# Patient Record
Sex: FEMALE | Race: WHITE | Employment: FULL TIME | URBAN - METROPOLITAN AREA
[De-identification: names, ages, dates, MRNs, and addresses within clinical notes are randomized per-mention and may not be internally consistent; named-entity substitution may affect disease eponyms.]

---

## 2025-01-07 NOTE — PROGRESS NOTES
"Subjective   Patient ID: Mila Carlson is a 34 y.o. female who presents for Establish Care (Pt is here to establish care / nr) and GI Problem (Pt would like to discuss GI issues, such as painful bloating, acid reflex, nausea, constipation and diarrhea, some difficulty swallowing, fatigue after eating,  for years  / nr).    HPI   NP w/PMHx anxiety, ADHD, sleep disorder, paroxysmal tachycardia here to discuss GI upset    Palpitations - went to ED a few years ago (HR in the 170s). Was discharged w/an event monitor but results were lost. Has not had any severe episodes since then. Now occasionally feels like heart is skipping a beat - happens a few times a week. Has associated dyspnea but no dizziness/CP. +FamHx (PGF s/p triple bypass)    GI upset - previous doctor thought maybe related to anxiety. She has been seeing psychiatry and feels these sxs are under control but does not have any improvement in GI sxs. C/o excessive gastric reflux, dysphagia (sometimes takes her a while to swallow even just saliva), bloating. Abd feels \"full of air.\" Has cut out gluten + most dairy from her diet x 5 years which helped w/sxs of bloating, migraines, + bloody stool (which has resolved since removing gluten).    Still has issues w/nausea, bloating, acid reflux. Takes pepcid daily which helps w/pain of reflux. Has never seen GI or had an EGD. +FamHx throat CA (dad).     Periods used to be irregular but have been more regular since starting sertraline so she attributes this to anxiety. Has been experiencing more clotting than normal. +FamHx ovarian CA (great aunt).    Review of Systems   Respiratory:  Positive for shortness of breath.    Cardiovascular:  Positive for palpitations. Negative for chest pain.   Neurological:  Positive for dizziness.   Psychiatric/Behavioral:  The patient is nervous/anxious.        Objective   /76   Pulse 85   Wt 89.3 kg (196 lb 12.8 oz)   SpO2 98%   BMI 37.80 kg/m²     Physical " Exam    Assessment/Plan   Problem List Items Addressed This Visit    None  Visit Diagnoses         Codes    Palpitations    -  Primary R00.2    Relevant Orders    Holter or Event Cardiac Monitor    Gluten intolerance     K90.41    Bloating     R14.0    Relevant Orders    US PELVIS TRANSABDOMINAL WITH TRANSVAGINAL    Referral to Gastroenterology    Menorrhagia with regular cycle     N92.0    Relevant Orders    US PELVIS TRANSABDOMINAL WITH TRANSVAGINAL    GERD without esophagitis     K21.9    Relevant Orders    Referral to Gastroenterology          FU for CPE at which time will discuss weight loss

## 2025-01-09 ENCOUNTER — OFFICE VISIT (OUTPATIENT)
Dept: PRIMARY CARE | Facility: CLINIC | Age: 35
End: 2025-01-09
Payer: COMMERCIAL

## 2025-01-09 VITALS
OXYGEN SATURATION: 98 % | DIASTOLIC BLOOD PRESSURE: 76 MMHG | SYSTOLIC BLOOD PRESSURE: 120 MMHG | BODY MASS INDEX: 37.8 KG/M2 | WEIGHT: 196.8 LBS | HEART RATE: 85 BPM

## 2025-01-09 DIAGNOSIS — R14.0 BLOATING: ICD-10-CM

## 2025-01-09 DIAGNOSIS — R00.2 PALPITATIONS: Primary | ICD-10-CM

## 2025-01-09 DIAGNOSIS — R73.01 IFG (IMPAIRED FASTING GLUCOSE): ICD-10-CM

## 2025-01-09 DIAGNOSIS — Z13.220 LIPID SCREENING: ICD-10-CM

## 2025-01-09 DIAGNOSIS — K21.9 GERD WITHOUT ESOPHAGITIS: ICD-10-CM

## 2025-01-09 DIAGNOSIS — N92.0 MENORRHAGIA WITH REGULAR CYCLE: ICD-10-CM

## 2025-01-09 DIAGNOSIS — Z13.1 SCREENING FOR DIABETES MELLITUS: ICD-10-CM

## 2025-01-09 DIAGNOSIS — K90.41 GLUTEN INTOLERANCE: ICD-10-CM

## 2025-01-09 DIAGNOSIS — R63.5 WEIGHT GAIN: ICD-10-CM

## 2025-01-09 DIAGNOSIS — Z13.89 SCREENING FOR HEMATURIA OR PROTEINURIA: ICD-10-CM

## 2025-01-09 DIAGNOSIS — E55.9 VITAMIN D DEFICIENCY: ICD-10-CM

## 2025-01-09 PROBLEM — F90.0 ATTENTION DEFICIT HYPERACTIVITY DISORDER, PREDOMINANTLY INATTENTIVE TYPE: Status: ACTIVE | Noted: 2025-01-09

## 2025-01-09 PROBLEM — J45.909 ASTHMA: Status: ACTIVE | Noted: 2025-01-09

## 2025-01-09 PROBLEM — I47.9 PAROXYSMAL TACHYCARDIA: Status: ACTIVE | Noted: 2025-01-09

## 2025-01-09 PROBLEM — G47.9 SLEEP DISTURBANCE: Status: ACTIVE | Noted: 2025-01-09

## 2025-01-09 PROBLEM — F41.9 ANXIETY: Status: ACTIVE | Noted: 2025-01-09

## 2025-01-09 PROBLEM — F32.A DEPRESSION: Status: ACTIVE | Noted: 2025-01-09

## 2025-01-09 PROCEDURE — 99204 OFFICE O/P NEW MOD 45 MIN: CPT | Performed by: PHYSICIAN ASSISTANT

## 2025-01-09 PROCEDURE — 1036F TOBACCO NON-USER: CPT | Performed by: PHYSICIAN ASSISTANT

## 2025-01-09 RX ORDER — SERTRALINE HYDROCHLORIDE 50 MG/1
25 TABLET, FILM COATED ORAL DAILY
COMMUNITY

## 2025-01-09 RX ORDER — SERTRALINE HYDROCHLORIDE 100 MG/1
1.5 TABLET, FILM COATED ORAL
COMMUNITY
Start: 2024-09-15

## 2025-01-09 RX ORDER — HYDROXYZINE HYDROCHLORIDE 50 MG/1
1 TABLET, FILM COATED ORAL
COMMUNITY
Start: 2024-03-21

## 2025-01-09 RX ORDER — ALBUTEROL SULFATE 90 UG/1
2 INHALANT RESPIRATORY (INHALATION) 4 TIMES DAILY
COMMUNITY
Start: 2024-03-23

## 2025-01-09 RX ORDER — TRAZODONE HYDROCHLORIDE 100 MG/1
1.5 TABLET ORAL
COMMUNITY
Start: 2024-09-19

## 2025-01-09 RX ORDER — BUDESONIDE AND FORMOTEROL FUMARATE DIHYDRATE 80; 4.5 UG/1; UG/1
2 AEROSOL RESPIRATORY (INHALATION)
COMMUNITY
Start: 2021-08-11

## 2025-01-09 ASSESSMENT — ENCOUNTER SYMPTOMS
PALPITATIONS: 1
NERVOUS/ANXIOUS: 1
DIZZINESS: 1
SHORTNESS OF BREATH: 1

## 2025-01-09 ASSESSMENT — PATIENT HEALTH QUESTIONNAIRE - PHQ9
SUM OF ALL RESPONSES TO PHQ9 QUESTIONS 1 AND 2: 0
1. LITTLE INTEREST OR PLEASURE IN DOING THINGS: NOT AT ALL
2. FEELING DOWN, DEPRESSED OR HOPELESS: NOT AT ALL

## 2025-01-09 ASSESSMENT — PAIN SCALES - GENERAL: PAINLEVEL_OUTOF10: 0-NO PAIN

## 2025-01-09 ASSESSMENT — COLUMBIA-SUICIDE SEVERITY RATING SCALE - C-SSRS
2. HAVE YOU ACTUALLY HAD ANY THOUGHTS OF KILLING YOURSELF?: NO
1. IN THE PAST MONTH, HAVE YOU WISHED YOU WERE DEAD OR WISHED YOU COULD GO TO SLEEP AND NOT WAKE UP?: NO
6. HAVE YOU EVER DONE ANYTHING, STARTED TO DO ANYTHING, OR PREPARED TO DO ANYTHING TO END YOUR LIFE?: NO

## 2025-01-21 ENCOUNTER — HOSPITAL ENCOUNTER (OUTPATIENT)
Dept: RADIOLOGY | Facility: CLINIC | Age: 35
Discharge: HOME | End: 2025-01-21
Payer: COMMERCIAL

## 2025-01-21 DIAGNOSIS — N92.0 MENORRHAGIA WITH REGULAR CYCLE: ICD-10-CM

## 2025-01-21 DIAGNOSIS — R14.0 BLOATING: ICD-10-CM

## 2025-01-23 ENCOUNTER — HOSPITAL ENCOUNTER (OUTPATIENT)
Dept: RADIOLOGY | Facility: CLINIC | Age: 35
Discharge: HOME | End: 2025-01-23
Payer: COMMERCIAL

## 2025-01-23 PROCEDURE — 76830 TRANSVAGINAL US NON-OB: CPT

## 2025-03-05 LAB
25(OH)D3+25(OH)D2 SERPL-MCNC: 36 NG/ML (ref 30–100)
ALBUMIN SERPL-MCNC: 4.5 G/DL (ref 3.6–5.1)
ALP SERPL-CCNC: 62 U/L (ref 31–125)
ALT SERPL-CCNC: 37 U/L (ref 6–29)
ANION GAP SERPL CALCULATED.4IONS-SCNC: 9 MMOL/L (CALC) (ref 7–17)
APPEARANCE UR: CLEAR
AST SERPL-CCNC: 22 U/L (ref 10–30)
BACTERIA #/AREA URNS HPF: ABNORMAL /HPF
BILIRUB SERPL-MCNC: 0.6 MG/DL (ref 0.2–1.2)
BILIRUB UR QL STRIP: NEGATIVE
BUN SERPL-MCNC: 12 MG/DL (ref 7–25)
CALCIUM SERPL-MCNC: 9.3 MG/DL (ref 8.6–10.2)
CHLORIDE SERPL-SCNC: 106 MMOL/L (ref 98–110)
CHOLEST SERPL-MCNC: 190 MG/DL
CHOLEST/HDLC SERPL: 4.4 (CALC)
CO2 SERPL-SCNC: 25 MMOL/L (ref 20–32)
COLOR UR: YELLOW
CREAT SERPL-MCNC: 0.65 MG/DL (ref 0.5–0.97)
EGFRCR SERPLBLD CKD-EPI 2021: 117 ML/MIN/1.73M2
EST. AVERAGE GLUCOSE BLD GHB EST-MCNC: 108 MG/DL
EST. AVERAGE GLUCOSE BLD GHB EST-SCNC: 6 MMOL/L
GLUCOSE SERPL-MCNC: 90 MG/DL (ref 65–99)
GLUCOSE UR QL STRIP: NEGATIVE
HBA1C MFR BLD: 5.4 % OF TOTAL HGB
HDLC SERPL-MCNC: 43 MG/DL
HGB UR QL STRIP: NEGATIVE
HYALINE CASTS #/AREA URNS LPF: ABNORMAL /LPF
KETONES UR QL STRIP: NEGATIVE
LDLC SERPL CALC-MCNC: 124 MG/DL (CALC)
LEUKOCYTE ESTERASE UR QL STRIP: ABNORMAL
NITRITE UR QL STRIP: NEGATIVE
NONHDLC SERPL-MCNC: 147 MG/DL (CALC)
PH UR STRIP: 6 [PH] (ref 5–8)
POTASSIUM SERPL-SCNC: 4.3 MMOL/L (ref 3.5–5.3)
PROT SERPL-MCNC: 7.1 G/DL (ref 6.1–8.1)
PROT UR QL STRIP: NEGATIVE
RBC #/AREA URNS HPF: ABNORMAL /HPF
SERVICE CMNT-IMP: ABNORMAL
SODIUM SERPL-SCNC: 140 MMOL/L (ref 135–146)
SP GR UR STRIP: 1.02 (ref 1–1.03)
SQUAMOUS #/AREA URNS HPF: ABNORMAL /HPF
TRIGL SERPL-MCNC: 118 MG/DL
TSH SERPL-ACNC: 1.88 MIU/L
WBC #/AREA URNS HPF: ABNORMAL /HPF

## 2025-03-20 ENCOUNTER — OFFICE VISIT (OUTPATIENT)
Dept: URGENT CARE | Age: 35
End: 2025-03-20
Payer: COMMERCIAL

## 2025-03-20 VITALS
OXYGEN SATURATION: 98 % | HEART RATE: 84 BPM | RESPIRATION RATE: 20 BRPM | SYSTOLIC BLOOD PRESSURE: 132 MMHG | DIASTOLIC BLOOD PRESSURE: 86 MMHG | TEMPERATURE: 98 F

## 2025-03-20 DIAGNOSIS — B96.89 BACTERIAL SINUSITIS: Primary | ICD-10-CM

## 2025-03-20 DIAGNOSIS — J32.9 BACTERIAL SINUSITIS: Primary | ICD-10-CM

## 2025-03-20 PROCEDURE — 99203 OFFICE O/P NEW LOW 30 MIN: CPT | Performed by: SURGERY

## 2025-03-20 PROCEDURE — 1036F TOBACCO NON-USER: CPT | Performed by: SURGERY

## 2025-03-20 RX ORDER — OMEPRAZOLE 40 MG/1
1 CAPSULE, DELAYED RELEASE ORAL
COMMUNITY
Start: 2025-02-10

## 2025-03-20 RX ORDER — PREDNISONE 20 MG/1
40 TABLET ORAL DAILY
Qty: 10 TABLET | Refills: 0 | Status: SHIPPED | OUTPATIENT
Start: 2025-03-20 | End: 2025-03-25

## 2025-03-20 RX ORDER — AZITHROMYCIN 250 MG/1
TABLET, FILM COATED ORAL
Qty: 6 TABLET | Refills: 0 | Status: SHIPPED | OUTPATIENT
Start: 2025-03-20 | End: 2025-03-25

## 2025-03-20 ASSESSMENT — PAIN SCALES - GENERAL: PAINLEVEL_OUTOF10: 5

## 2025-03-20 NOTE — PROGRESS NOTES
Chief Complaint   Patient presents with    Sinus Problem     Started today as migraine headache. Now has intense pain around left eye and into teeth. Some nausea. Concerned about sinus infection. Has taken 2 hydroxyzine-1 at 1 pm and another at 3 pm.       Physical Exam:     GEN: No acute distress    ENT: Bilateral TMs bulging with serous effusions, canals unremarkable, sinus congestion present. Left frontal and maxillary sinus tender to finger tap. Pharynx and tonsils mildly hyperemic but without exudate.     Resp: Lungs clear to auscultation bilaterally           Encounter Diagnosis   Name Primary?    Bacterial sinusitis Yes        Medical Decision Making & Plan:   Azithromycin  prednisone    Home      03/20/25 at 4:36 PM - Cheryl Greene, DO

## 2025-03-24 PROBLEM — E66.812 CLASS 2 SEVERE OBESITY DUE TO EXCESS CALORIES WITH SERIOUS COMORBIDITY AND BODY MASS INDEX (BMI) OF 38.0 TO 38.9 IN ADULT: Status: ACTIVE | Noted: 2025-03-24

## 2025-03-24 PROBLEM — E66.01 CLASS 2 SEVERE OBESITY DUE TO EXCESS CALORIES WITH SERIOUS COMORBIDITY AND BODY MASS INDEX (BMI) OF 38.0 TO 38.9 IN ADULT: Status: ACTIVE | Noted: 2025-03-24

## 2025-03-24 PROBLEM — E78.5 DYSLIPIDEMIA: Status: ACTIVE | Noted: 2025-03-24

## 2025-03-24 ASSESSMENT — ENCOUNTER SYMPTOMS
BLOOD IN STOOL: 0
ABDOMINAL PAIN: 0
SHORTNESS OF BREATH: 0
HEADACHES: 0
LIGHT-HEADEDNESS: 0
DIZZINESS: 0

## 2025-03-24 NOTE — PROGRESS NOTES
"Subjective   Patient ID: Mila Carlson is a 34 y.o. female who presents for Follow-up (Pt is here to discuss blood work  / nr).    HPI   Palpitations - occasional skipped beats w/associated dyspnea, no dizziness or CP. had holter monitor ordered last visit, had to postpone. Plans on calling to reschedule    HLD - no FamHx of MI. Nonsmoker, quit 10 years ago.    Asthma - previously on symbicort but has identified triggers and knows what to avoid. uses albuterol more in spring + summer.     GI upset - reflux, dysphagia, bloating. Has removed gluten + most dairy from diet x 5 years. Referred to GI who recommended she increase her fiber. Had elevated ALT but denies excessive APAP, EtOH.     Weight - BMI 39. Labs w/elevated ALT (37), TC:HDL 4.4. nml TSH. A1c 5.4. not currently exercising, likes to walk in the summer. States diet \"could be better.\" Does not eat a lot of fried foods.    +FamHx ovarian CA (great aunt) - had pelvic US w/R hemorrhagic ovarian cyst, otherwise unremarkable.    HM: no indication for early colonoscopy. Due for pap  Shx: working on associates of science, then plans to finish bachelor degree at [employer]. Wants to be an .     Review of Systems   HENT:  Negative for hearing loss.    Eyes:  Negative for visual disturbance.   Respiratory:  Negative for shortness of breath.    Cardiovascular:  Negative for chest pain.   Gastrointestinal:  Negative for abdominal pain and blood in stool.   Genitourinary:  Negative for vaginal bleeding.   Neurological:  Negative for dizziness, light-headedness and headaches.       Objective   /72   Pulse 70   Wt 90.4 kg (199 lb 6.4 oz)   LMP 03/08/2025   SpO2 97%   BMI 38.30 kg/m²     Physical Exam  Vitals reviewed.   Constitutional:       Appearance: Normal appearance.   HENT:      Head: Normocephalic and atraumatic.      Right Ear: Tympanic membrane and ear canal normal.      Left Ear: Tympanic membrane and ear canal normal.      Nose: Nose normal.      " Mouth/Throat:      Mouth: Mucous membranes are moist.      Pharynx: No oropharyngeal exudate.   Eyes:      Extraocular Movements: Extraocular movements intact.      Conjunctiva/sclera: Conjunctivae normal.      Pupils: Pupils are equal, round, and reactive to light.   Cardiovascular:      Rate and Rhythm: Normal rate and regular rhythm.      Heart sounds: Normal heart sounds.   Pulmonary:      Effort: Pulmonary effort is normal.      Breath sounds: Normal breath sounds. No wheezing.   Abdominal:      General: There is no distension.      Palpations: Abdomen is soft.      Tenderness: There is no abdominal tenderness.   Musculoskeletal:         General: No tenderness.      Cervical back: Normal range of motion and neck supple.   Skin:     General: Skin is warm and dry.      Findings: No rash.   Neurological:      General: No focal deficit present.      Mental Status: She is alert. Mental status is at baseline.   Psychiatric:         Mood and Affect: Mood normal.         Assessment/Plan   Problem List Items Addressed This Visit             ICD-10-CM    Paroxysmal tachycardia I47.9    Dyslipidemia E78.5    Class 2 severe obesity due to excess calories with serious comorbidity and body mass index (BMI) of 38.0 to 38.9 in adult E66.812, E66.01, Z68.38    Mild persistent asthma without complication ([facility]) J45.30     Other Visit Diagnoses         Codes    Routine medical exam    -  Primary Z00.00          Discussed mediterranean diet, increasing physical activity. Plans to schedule Holter monitor. If everything nml, can FU w/me in 1 year, sooner PRN.

## 2025-03-27 ENCOUNTER — OFFICE VISIT (OUTPATIENT)
Dept: PRIMARY CARE | Facility: CLINIC | Age: 35
End: 2025-03-27
Payer: COMMERCIAL

## 2025-03-27 VITALS
HEART RATE: 70 BPM | OXYGEN SATURATION: 97 % | DIASTOLIC BLOOD PRESSURE: 72 MMHG | SYSTOLIC BLOOD PRESSURE: 116 MMHG | WEIGHT: 199.4 LBS | BODY MASS INDEX: 38.3 KG/M2

## 2025-03-27 DIAGNOSIS — J45.30 MILD PERSISTENT ASTHMA WITHOUT COMPLICATION (HHS-HCC): ICD-10-CM

## 2025-03-27 DIAGNOSIS — E66.01 CLASS 2 SEVERE OBESITY DUE TO EXCESS CALORIES WITH SERIOUS COMORBIDITY AND BODY MASS INDEX (BMI) OF 38.0 TO 38.9 IN ADULT: ICD-10-CM

## 2025-03-27 DIAGNOSIS — Z00.00 ROUTINE MEDICAL EXAM: Primary | ICD-10-CM

## 2025-03-27 DIAGNOSIS — I47.9 PAROXYSMAL TACHYCARDIA: ICD-10-CM

## 2025-03-27 DIAGNOSIS — E66.812 CLASS 2 SEVERE OBESITY DUE TO EXCESS CALORIES WITH SERIOUS COMORBIDITY AND BODY MASS INDEX (BMI) OF 38.0 TO 38.9 IN ADULT: ICD-10-CM

## 2025-03-27 DIAGNOSIS — E78.5 DYSLIPIDEMIA: ICD-10-CM

## 2025-03-27 PROBLEM — J45.909 ASTHMA: Status: RESOLVED | Noted: 2025-01-09 | Resolved: 2025-03-27

## 2025-03-27 PROCEDURE — 99395 PREV VISIT EST AGE 18-39: CPT | Performed by: PHYSICIAN ASSISTANT

## 2025-03-27 PROCEDURE — 1036F TOBACCO NON-USER: CPT | Performed by: PHYSICIAN ASSISTANT

## 2025-03-27 RX ORDER — FLUTICASONE PROPIONATE 50 MCG
1 SPRAY, SUSPENSION (ML) NASAL DAILY
COMMUNITY

## 2025-03-27 RX ORDER — DICYCLOMINE HYDROCHLORIDE 20 MG/1
20 TABLET ORAL
COMMUNITY

## 2025-03-27 ASSESSMENT — PATIENT HEALTH QUESTIONNAIRE - PHQ9
SUM OF ALL RESPONSES TO PHQ9 QUESTIONS 1 AND 2: 0
2. FEELING DOWN, DEPRESSED OR HOPELESS: NOT AT ALL
1. LITTLE INTEREST OR PLEASURE IN DOING THINGS: NOT AT ALL

## 2025-03-27 ASSESSMENT — COLUMBIA-SUICIDE SEVERITY RATING SCALE - C-SSRS
2. HAVE YOU ACTUALLY HAD ANY THOUGHTS OF KILLING YOURSELF?: NO
6. HAVE YOU EVER DONE ANYTHING, STARTED TO DO ANYTHING, OR PREPARED TO DO ANYTHING TO END YOUR LIFE?: NO
1. IN THE PAST MONTH, HAVE YOU WISHED YOU WERE DEAD OR WISHED YOU COULD GO TO SLEEP AND NOT WAKE UP?: NO

## 2025-03-27 ASSESSMENT — PAIN SCALES - GENERAL: PAINLEVEL_OUTOF10: 0-NO PAIN
